# Patient Record
Sex: FEMALE | Race: WHITE
[De-identification: names, ages, dates, MRNs, and addresses within clinical notes are randomized per-mention and may not be internally consistent; named-entity substitution may affect disease eponyms.]

---

## 2017-04-26 ENCOUNTER — HOSPITAL ENCOUNTER (EMERGENCY)
Dept: HOSPITAL 7 - FB.ED | Age: 57
Discharge: HOME | End: 2017-04-26
Payer: MEDICARE

## 2017-04-26 VITALS — SYSTOLIC BLOOD PRESSURE: 146 MMHG | DIASTOLIC BLOOD PRESSURE: 81 MMHG

## 2017-04-26 DIAGNOSIS — S52.125A: Primary | ICD-10-CM
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
## 2017-04-26 DIAGNOSIS — Z88.5: ICD-10-CM

## 2017-04-26 PROCEDURE — 73030 X-RAY EXAM OF SHOULDER: CPT

## 2017-04-26 PROCEDURE — 99284 EMERGENCY DEPT VISIT MOD MDM: CPT

## 2017-04-26 PROCEDURE — 73090 X-RAY EXAM OF FOREARM: CPT

## 2017-04-26 NOTE — EDM.PDOC
ED HPI Trauma





- General


Chief Complaint: Trauma


Stated Complaint: INJURED ARM


Time Seen by Provider: 04/26/17 16:21


Source: Reports: Patient, Family


History Limitations: Reports: No limitations





- History of Present Illness


INITIAL COMMENTS - FREE TEXT/NARRATIVE: 





56 years old w f came to the ed with her family after she fell down the stairs, 

landing at her left elbow. Pt has sever pain with movement at her left elbow, 

forearm and wrist. Pt denied any other medical issues. 


Symptom Onset Date: 04/26/17


Symptom Onset Time: 14:00


Occurred When: just prior to arrival


Method of Injury: fall


Severity: mild


Pain/Injury Location: Reports: upper extremity, left


Consciousness: Reports: no loss of consciousness


Associated Symptoms: Reports: denies other symptoms


Allergies/ADRs: 


Allergies





morphine Allergy (Severe, Verified 04/26/17 16:43)


 Nausea and Vomiting








Home Medications: 


Ambulatory Orders





Albuterol [IMW: Albuterol HFA] 1 puff .XX Q4HR PRN 04/26/17 [Confirmed 04/26/17]


Budesonide/Formoterol [Symbicort 80-4.5 MCG] 1 puff INH BID 04/26/17 [Confirmed 

04/26/17]


Umeclidinium Bromide [Incruse Ellipta] 62.5 mcg IH DAILY 04/26/17 [Confirmed 04/ 26/17]











Review of Systems





- Review of Systems


Review Of Systems: See Below


Constitutional: Reports: no symptoms


Eyes: Reports: no symptoms


Ears: Reports: no symptoms


Nose: Reports: no symptoms


Mouth/Throat: Reports: no symptoms


Respiratory: Reports: No Symptoms


Cardiovascular: Reports: no symptoms


GI/Abdominal: Reports: No symptoms


Genitourinary: Reports: no symptoms


Musculoskeletal: Reports: arm pain (left )


Skin: Reports: no symptoms


Neurological: Reports: No Symptoms


Psychiatric: Reports: no symptoms





Trauma Exam





- Physical Exam


Exam: See Below


Exam Limited By: Physical impairment


General Appearance: Reports: alert, WD/WN, mild distress


Head: Reports: atraumatic, normocephalic


Eyes: bilateral eye: normal inspection


Ears: Reports: normal external exam, normal canal


Nose: Reports: normal inspection, normal mucousa


Throat/Mouth: Reports: Normal inspection, Normal lips


Neck: Reports: non-tender, full range of motion, normal alignment


Respiratory Exam: Reports: no respiratory distress, lungs clear, normal breath 

sounds


Cardiovascular: Reports: normal peripheral pulses, regular rate, rhythm


GI/Abdominal: Reports: normal bowel sounds, soft, non tender, no organomegaly


 (Female) Exam: Deferred


Rectal (Female) Exam: Deferred


Back: Reports: full range of motion, normal inspection, non-tender


Extremities: Reports: pain with movement (left elbow)


Neurologic: Reports: CNs II-XII nml as tested


Skin: Reports: Normal color





Course





- Vital Signs


Text/Narrative:: 





56 years old w f came to the ed with her family after she fell down the stairs, 

landing at her left elbow. Pt has sever pain with movement at her left elbow, 

forearm and wrist. Pt denied any other medical issues.


PE: left elbow pain with movement/swellind


Imaging: prox radius fx, official report is pending


Impression: prox radius fx, Fall


Tx: Ice. Motrin, armsling


Consultation: Dr. Mueller, Ortho: Iggy, f/u in am in his clinic in 

[place] 


Plan: D/C with instructions


Last Recorded V/S: 


 Last Vital Signs











Temp  36.6 C   04/26/17 16:20


 


Pulse  73   04/26/17 16:20


 


Resp  20   04/26/17 16:20


 


BP  146/81 H  04/26/17 16:20


 


Pulse Ox  96   04/26/17 16:20














- Orders/Labs/Meds


Orders: 


 Active Orders 24 hr











 Category Date Time Status


 


 Cooling Warming Measures [RC] ASDIRECTED Care  04/26/17 17:27 Active


 


 Forearm 2V Lt [CR] Stat Exams  04/26/17 16:39 Taken


 


 Shoulder Comp Lt [CR] Stat Exams  04/26/17 16:39 Taken


 


 Ice Bag [Ice Therapy] [OM.PC] Routine Oth  04/26/17 17:27 Ordered











Meds: 


Medications














Discontinued Medications














Generic Name Dose Route Start Last Admin





  Trade Name Freq  PRN Reason Stop Dose Admin


 


Ibuprofen  600 mg  04/26/17 17:22  





  Motrin  PO  04/26/17 17:23  





  ONETIME ONE   














Departure





- Departure


Time of Disposition: 17:30


Disposition: Home, Self-Care 01


Condition: good


Clinical Impression: 


Radial head fracture, closed


Qualifiers:


 Encounter type: initial encounter Fracture alignment: nondisplaced Laterality: 

left Qualified Code(s): S52.125A - Nondisplaced fracture of head of left radius

, initial encounter for closed fracture





Referrals: 


PCP,Not In Area [Primary Care Provider] - 


Fermín Mueller MD [Physician] - 


Additional Instructions: 


ICE, Rest and elevation. Please use arm sling, please apply ice to the affected 

area, Motrin for pain, please follow up in am with , Ortho, I am. 

Please call first to get the time you are able to see him. Please come back the 

ed if your symptoms get worse acutely.  





- My Orders


Last 24 Hours: 


My Active Orders





04/26/17 16:39


Forearm 2V Lt [CR] Stat 


Shoulder Comp Lt [CR] Stat 





04/26/17 17:27


Cooling Warming Measures [RC] ASDIRECTED 


Ice Bag [Ice Therapy] [OM.PC] Routine 














- Assessment/Plan


Last 24 Hours: 


My Active Orders





04/26/17 16:39


Forearm 2V Lt [CR] Stat 


Shoulder Comp Lt [CR] Stat 





04/26/17 17:27


Cooling Warming Measures [RC] ASDIRECTED 


Ice Bag [Ice Therapy] [OM.PC] Routine

## 2017-04-28 NOTE — CR
INDICATION:  Trauma.



LEFT SHOULDER COMPLETE:  Three views of the left shoulder revealed hypertrophic 
degenerative changes at the glenohumeral joint with suggestion of mild joint 
space narrowing.  A definite acute fracture or dislocation was not identified.  



MTDD

## 2017-04-28 NOTE — CR
INDICATION:  Trauma. 



LEFT FOREARM:  Frontal and lateral views of the left forearm with four images 
were obtained and revealed evidence of a joint effusion at the elbow joint.  
However, a definite fracture site was not identified.  There is noted a mach 
effect at the coronoid process.  Additionally there is suspicion of a hairline 
fracture through the coronoid process of the ulna. 



A joint effusion is present at the elbow, which would be compatible with 
fracture.  This should be correlated clinically.  A follow-up study in 10-14 
days should be confirmatory.  



No other evidence of a fracture, dislocation, or other significant bone or 
joint abnormality was identified.  



IMPRESSION:  Findings suggest the possibility of a hairline appearing fracture 
of the coronoid process - adequate position and alignment with elbow joint 
effusion.  Findings should be correlated clinically.  Follow-up study in 10-14 
days should be confirmatory.  CT could be obtained for confirmation also, as 
felt to be clinically necessary.  
MTDD

## 2018-07-02 ENCOUNTER — HOSPITAL ENCOUNTER (OUTPATIENT)
Dept: HOSPITAL 7 - FB.SDS | Age: 58
Discharge: HOME | End: 2018-07-02
Payer: MEDICARE

## 2018-07-02 VITALS — DIASTOLIC BLOOD PRESSURE: 76 MMHG | SYSTOLIC BLOOD PRESSURE: 123 MMHG

## 2018-07-02 DIAGNOSIS — J44.9: ICD-10-CM

## 2018-07-02 DIAGNOSIS — M81.0: ICD-10-CM

## 2018-07-02 DIAGNOSIS — Z87.891: ICD-10-CM

## 2018-07-02 DIAGNOSIS — F41.8: ICD-10-CM

## 2018-07-02 DIAGNOSIS — Z80.0: ICD-10-CM

## 2018-07-02 DIAGNOSIS — Z12.11: Primary | ICD-10-CM

## 2018-07-02 DIAGNOSIS — Z88.5: ICD-10-CM

## 2018-07-02 DIAGNOSIS — K57.30: ICD-10-CM

## 2018-07-02 DIAGNOSIS — K63.5: ICD-10-CM

## 2018-07-02 DIAGNOSIS — Z79.899: ICD-10-CM

## 2018-07-02 RX ADMIN — ASPIRIN ONE MG: 325 TABLET, DELAYED RELEASE ORAL at 10:28

## 2018-07-02 NOTE — OR
DATE OF OPERATION:  07/02/2018

 

SURGEON:  Erik Martínez MD

 

PREOPERATIVE DIAGNOSIS:  Family history of colon cancer.

 

POSTOPERATIVE DIAGNOSIS:  Colon polyp, diverticulosis.

 

OPERATION PERFORMED:  Colonoscopy with polypectomy.

 

INDICATIONS FOR SURGERY:  This is a 57-year-old female who has a known family

history of colon carcinoma in her brother and her son.  Her last colon exam was

over 5 years ago and she comes for colonoscopy.

 

FINDINGS:  A single small polyp noted in the sigmoid colon 25 cm from the anal

verge.  This is approximately 5 mm in size and sessile in configuration.  The

patient has a moderate degree of sigmoid diverticulosis with some angulation of

the sigmoid colon.  The colon otherwise appears normal.

 

PROCEDURE IN DETAIL:  The patient was taken to the procedure room.  She was

given intravenous sedation and with her in the left lateral decubitus position,

digital rectal exam was performed showing no rectal masses.  The Olympus

colonoscope was inserted into the rectum.  Retroflexed examination of the rectal

canal was performed.  The scope was then carefully advanced to the sigmoid colon

level where the above-described polyp was identified.  This polyp was removed

with a cautery snare and retrieved.  The polyp was almost completely destroyed

with cautery, but some small fragments of tissue were submitted.  The scope was

then carefully advanced under direct visualization through the entire length of

the colon until cecum was reached.  Cecal acquisition was confirmed by noting

the normal internal cecal anatomy including the appendiceal orifice and

ileocecal valve.  The light was also noted to transilluminate the abdominal wall

in the right lower quadrant.  After examining the cecum, the scope was fully

withdrawn sequentially re-examining the colonic segments until the entire colon

and rectum had been fully examined.  The scope was removed and the patient was

taken from the procedure room in satisfactory condition.

 

ESTIMATED BLOOD LOSS:  Zero.

 

COMPLICATIONS:  None.

 

PROGNOSIS:  Good.

 

Job#: [id number]

DD: 07/02/2018 0956

DT: 07/02/2018 1605 ENZO/RODGER

## 2018-07-02 NOTE — PCM.OPNOTE
- General Post-Op/Procedure Note


Date of Surgery/Procedure: 07/02/18


Operative Procedure(s): Colonoscopy with polypectomy


Findings: 





Small Sigmoid Colon Polyp


Moderate Sigmoid Diverticulosis


Pre Op Diagnosis: Family history of colon cancer


Post-Op Diagnosis: Diverticulosis.  Colon Polyp


Anesthesia Technique: MAC


Primary Surgeon: Erik Martínez


Pathology: 





Sigmoid Colon Polyp


Output, Urine Amount: 0


EBL in mLs: 0


Complications: None


Condition: Good

## 2018-07-02 NOTE — PCM.PN
- General Info


Date of Service: 07/02/18





- Review of Systems


Systems Review Comment:: 





58 y/o female here for colonoscopy.  She has a strong family history of colon 

cancer in Brother and Son.  I have discussed the proposed colonoscopy with the 

patient.  Risks such as but not limited to bleeding and GI injury discussed and 

she agrees to proceed.





- Patient Data


Weight - Most Recent: 152 lb


Med Orders - Current: 


 Current Medications





Lactated Ringer's (Ringers, Lactated)  1,000 mls @ 125 mls/hr IV ASDIRECTED MICHAEL


Sodium Chloride (Saline Flush)  10 ml FLUSH ASDIRECTED PRN


   PRN Reason: Keep Vein Open











- Problem List Review


Problem List Initiated/Reviewed/Updated: Yes





- My Orders


Last 24 Hours: 


My Active Orders





07/02/18 07:45


Patient Status [ADT] Routine 


Verify Patient Consent Obtain [RC] ASDIRECTED 


Lactated Ringers [Ringers, Lactated] 1,000 ml IV ASDIRECTED 


Sodium Chloride 0.9% [Saline Flush]   10 ml FLUSH ASDIRECTED PRN 


Peripheral IV Insertion Adult [OM.PC] Routine 





07/02/18 Breakfast


Nothing Per Oral Diet [DIET] 














- Assessment


Assessment:: 





family history of colon cancer





- Plan


Plan:: 





Colonoscopy